# Patient Record
(demographics unavailable — no encounter records)

---

## 2025-02-17 NOTE — HISTORY OF PRESENT ILLNESS
[FreeTextEntry1] : Pt is a 55 y/o F PMH DM, HLD, BMI 30 Family hx: father MI 53, mother HTN, sister HTN, brother HTN She tells me that she gets "chest twinges" from time to time which lasts seconds, not related with exertion.  She denies SOB, diaphoresis, palpitations, dizziness, syncope, LE edema, PND, orthopnea.   TTE 10/2024 EF 63%, mild TR CCTA 12/2024 Ca score = 0, patent coronary arteries CUS 10/2024 wnl, thyroid nodule - following with endo  statins caused myalgias  PCP Dr Rojas Previous hospitalizations: none Previous surgeries: none Family hx: no SCD, father MI 53, mother HTN, sister HTN, brother HTN Smoking status: never social ETOH no drug use Current exercise: walking 2x/week 20min Daily water intake: "not enough" Daily caffeine intake:2-3 cups tea OTC medications: advil PRN NKDA 1 child - no problem with pregnancies

## 2025-02-17 NOTE — DISCUSSION/SUMMARY
[EKG obtained to assist in diagnosis and management of assessed problem(s)] : EKG obtained to assist in diagnosis and management of assessed problem(s) [FreeTextEntry1] : Pt is a 55 y/o F PMH DM, HLD, BMI 30 Family hx: father MI 53, mother HTN, sister HTN, brother HTN  HLD: c/w lovaza, fenofibrate goal LDL <70 Advised lifestyle modifications  will try to get a copy of recent labs  DM: follows with PCP c/w current meds goal A1c <7 Advised lifestyle modifications   Pt will return in 12 mnths or sooner as needed but I encouraged communication via phone or portal if necessary.  We will call pt with test results when applicable and arrange for expedited follow up if necessary.  Most recent available lab results were reviewed with pt. The described plan was discussed with the pt.  All questions and concerns were addressed to the best of my knowledge.